# Patient Record
Sex: MALE | Race: WHITE | NOT HISPANIC OR LATINO | Employment: FULL TIME | URBAN - METROPOLITAN AREA
[De-identification: names, ages, dates, MRNs, and addresses within clinical notes are randomized per-mention and may not be internally consistent; named-entity substitution may affect disease eponyms.]

---
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
## 2018-01-16 DIAGNOSIS — T75.89XA EFFECTS OF EXPOSURE TO EXTERNAL CAUSE, INITIAL ENCOUNTER: ICD-10-CM

## 2018-01-16 DIAGNOSIS — E78.00 PURE HYPERCHOLESTEROLEMIA: ICD-10-CM

## 2018-01-19 ENCOUNTER — HOSPITAL ENCOUNTER (OUTPATIENT)
Dept: LAB | Facility: MEDICAL CENTER | Age: 18
End: 2018-01-19
Attending: FAMILY MEDICINE
Payer: COMMERCIAL

## 2018-01-19 DIAGNOSIS — T75.89XA EFFECTS OF EXPOSURE TO EXTERNAL CAUSE, INITIAL ENCOUNTER: ICD-10-CM

## 2018-01-19 DIAGNOSIS — E78.00 PURE HYPERCHOLESTEROLEMIA: ICD-10-CM

## 2018-01-19 LAB
CHOLEST SERPL-MCNC: 243 MG/DL (ref 100–199)
HAV IGM SERPL QL IA: NEGATIVE
HBV CORE IGM SER QL: NEGATIVE
HBV SURFACE AG SER QL: NEGATIVE
HCV AB SER QL: NEGATIVE
HDLC SERPL-MCNC: 39 MG/DL
HIV 1+2 AB+HIV1 P24 AG SERPL QL IA: NON REACTIVE
LDLC SERPL CALC-MCNC: 168 MG/DL
TREPONEMA PALLIDUM IGG+IGM AB [PRESENCE] IN SERUM OR PLASMA BY IMMUNOASSAY: NON REACTIVE
TRIGL SERPL-MCNC: 181 MG/DL (ref 0–149)

## 2018-01-19 PROCEDURE — 80061 LIPID PANEL: CPT

## 2018-01-19 PROCEDURE — 87591 N.GONORRHOEAE DNA AMP PROB: CPT

## 2018-01-19 PROCEDURE — 87389 HIV-1 AG W/HIV-1&-2 AB AG IA: CPT

## 2018-01-19 PROCEDURE — 86780 TREPONEMA PALLIDUM: CPT

## 2018-01-19 PROCEDURE — 36415 COLL VENOUS BLD VENIPUNCTURE: CPT

## 2018-01-19 PROCEDURE — 80074 ACUTE HEPATITIS PANEL: CPT

## 2018-01-19 PROCEDURE — 87491 CHLMYD TRACH DNA AMP PROBE: CPT

## 2018-01-20 LAB
C TRACH DNA SPEC QL NAA+PROBE: NEGATIVE
N GONORRHOEA DNA SPEC QL NAA+PROBE: NEGATIVE
SPECIMEN SOURCE: NORMAL

## 2018-04-20 ENCOUNTER — OFFICE VISIT (OUTPATIENT)
Dept: MEDICAL GROUP | Age: 18
End: 2018-04-20
Payer: COMMERCIAL

## 2018-04-20 VITALS
WEIGHT: 217 LBS | DIASTOLIC BLOOD PRESSURE: 80 MMHG | SYSTOLIC BLOOD PRESSURE: 123 MMHG | BODY MASS INDEX: 30.38 KG/M2 | HEART RATE: 94 BPM | TEMPERATURE: 98.4 F | HEIGHT: 71 IN | OXYGEN SATURATION: 99 %

## 2018-04-20 DIAGNOSIS — E66.9 OBESITY (BMI 30-39.9): ICD-10-CM

## 2018-04-20 DIAGNOSIS — F90.1 ATTENTION DEFICIT HYPERACTIVITY DISORDER (ADHD), PREDOMINANTLY HYPERACTIVE TYPE: ICD-10-CM

## 2018-04-20 DIAGNOSIS — E78.00 PURE HYPERCHOLESTEROLEMIA: ICD-10-CM

## 2018-04-20 PROBLEM — F90.9 ATTENTION DEFICIT HYPERACTIVITY DISORDER (ADHD): Status: ACTIVE | Noted: 2018-04-20

## 2018-04-20 PROCEDURE — 99214 OFFICE O/P EST MOD 30 MIN: CPT | Performed by: FAMILY MEDICINE

## 2018-04-20 RX ORDER — DEXTROAMPHETAMINE SACCHARATE, AMPHETAMINE ASPARTATE, DEXTROAMPHETAMINE SULFATE AND AMPHETAMINE SULFATE 2.5; 2.5; 2.5; 2.5 MG/1; MG/1; MG/1; MG/1
5 TABLET ORAL EVERY MORNING
Qty: 30 TAB | Refills: 0 | Status: SHIPPED | OUTPATIENT
Start: 2018-04-20 | End: 2018-04-23 | Stop reason: SDUPTHER

## 2018-04-20 ASSESSMENT — PATIENT HEALTH QUESTIONNAIRE - PHQ9: CLINICAL INTERPRETATION OF PHQ2 SCORE: 0

## 2018-04-20 NOTE — ASSESSMENT & PLAN NOTE
Patient states he has been exercising and watching his diet and he is not at his goal of losing 20 pounds to address his hyperlipidemia but he plans to be in the next couple months.

## 2018-04-20 NOTE — ASSESSMENT & PLAN NOTE
The patient has been using diet and exercise to lose weight in order to address this issue. He also states is a strong family history of hyperlipidemia.      Results for ANAY PADGETT (MRN [id number]) as of 4/20/2018 10:47   Ref. Range 1/19/2018 13:43   Cholesterol,Tot Latest Ref Range: 100 - 199 mg/dL 243 (H)   Triglycerides Latest Ref Range: 0 - 149 mg/dL 181 (H)   HDL Latest Ref Range: >=40 mg/dL 39 (A)   LDL Latest Ref Range: <100 mg/dL 168 (H)

## 2018-04-20 NOTE — ASSESSMENT & PLAN NOTE
Was diagnosed with ADD as a child at age 3  Was placed on straterra did not think those affected  Recently having more difficulty focusing in his senior year at [employer]. 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 He would like to try Adderall.

## 2018-04-20 NOTE — PROGRESS NOTES
This medical record contains text that has been entered with the assistance of computer voice recognition and dictation software.  Therefore, it may contain unintended errors in text, spelling, punctuation, or grammar    Chief Complaint   Patient presents with   • Annual Exam   • ADHD       Anay Chowdhury is a 17 y.o. male here evaluation and management of: Concerns of ADD, hyperlipidemia, obesity      HPI:     Attention deficit hyperactivity disorder (ADHD)  Was diagnosed with ADD as a child at age 3  Was placed on straterra did not think those affected  Recently having more difficulty focusing in his senior year at [employer]. He has been accepted into the accelerated Master's program in computer programming, he's been having much difficulty paying attention to detailed oriented tasks on [employer]. He would like to try Adderall.    Pure hypercholesterolemia  The patient has been using diet and exercise to lose weight in order to address this issue. He also states is a strong family history of hyperlipidemia.      Results for ANAY CHOWDHURY (MRN [id number]) as of 4/20/2018 10:47   Ref. Range 1/19/2018 13:43   Cholesterol,Tot Latest Ref Range: 100 - 199 mg/dL 243 (H)   Triglycerides Latest Ref Range: 0 - 149 mg/dL 181 (H)   HDL Latest Ref Range: >=40 mg/dL 39 (A)   LDL Latest Ref Range: <100 mg/dL 168 (H)       Obesity (BMI 30-39.9)  Patient states he has been exercising and watching his diet and he is not at his goal of losing 20 pounds to address his hyperlipidemia but he plans to be in the next couple months.    Current medicines (including changes today)  Current Outpatient Prescriptions   Medication Sig Dispense Refill   • amphetamine-dextroamphetamine (ADDERALL, 10MG,) 10 MG Tab Take 0.5 Tabs by mouth every morning for 60 days. 30 Tab 0   • tretinoin (RETIN-A) 0.025 % cream Thin layer to clean dry skin every bedtime as needed for acne 45 g 3   • imiquimod (ALDARA) 5 % cream Apply to affected area  "every other night then wash off in morning. 1 Each 1   • CLINDAMYCIN PHOSPHATE,TOPICAL, (CLEOCIN-T) 1 % Lotion Thin layer to clean dry skin bid as needed for acne 60 mL 5     No current facility-administered medications for this visit.      He  has a past medical history of Acne; ADD (attention deficit disorder); and Pure hypercholesterolemia (8/26/2016).  He  has a past surgical history that includes tympanoplasty and tympanotomy.  Social History   Substance Use Topics   • Smoking status: Never Smoker   • Smokeless tobacco: Never Used   • Alcohol use 0.0 oz/week      Comment: occasionally     Social History     Social History Narrative    2013: [employer]. Web design elective.    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 Mother is from [place].    2014 [employer]umberto at [employer]: wants to go to [employer]. Active in [employer], rifle, [employer], martial arts and does volunteer work and takes 3 AP classes.     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 Now wants to go to [employer]                     Family History   Problem Relation Age of Onset   • Hyperlipidemia Mother      Family Status   Relation Status   • Mother Alive   • Father Alive         ROS    Please see hpi     All other systems reviewed and are negative     Objective:     Blood pressure 123/80, pulse 94, temperature 36.9 °C (98.4 °F), height 1.791 m (5' 10.5\"), weight 98.4 kg (217 lb), SpO2 99 %. Body mass index is 30.7 kg/m².  Physical Exam:    Constitutional: Alert, no distress.  Skin: Warm, dry, good turgor, no rashes in visible areas.  Eye: Equal, round and reactive, conjunctiva clear, lids normal.  ENMT: Lips without lesions, good dentition, oropharynx clear.  Neck: Trachea midline, no masses, no thyromegaly. No cervical or supraclavicular lymphadenopathy.  Respiratory: Unlabored respiratory effort, lungs clear to auscultation, no wheezes, no ronchi.  Cardiovascular: Normal S1, S2, no murmur, no edema.  Abdomen: Soft, non-tender, no " masses, no hepatosplenomegaly.  Psych: Alert and oriented x3, normal affect and mood.              Assessment and Plan:   The following treatment plan was discussed      1. Attention deficit hyperactivity disorder (ADHD), predominantly hyperactive type    . Meets DSM V criteria for ADHD.  All side effects were explained to patients including but not limited to agitation, HTN,  insomnia, nervousness, wt loss, Emotional lability , anxiety , dizziness, and potential for abuse, etc..Patient is to RTC in 1wk to discuss effects of medication and adust dose if needed.    - amphetamine-dextroamphetamine (ADDERALL, 10MG,) 10 MG Tab; Take 0.5 Tabs by mouth every morning for 60 days.  Dispense: 30 Tab; Refill: 0    2. Obesity (BMI 30-39.9)    We discussed subsequent randomized trials, weight loss (via lifestyle or pharmacologic therapy) has been shown to reduce morbidity (reduction in risk factors for cardiovascular disease     The frequently cited Diabetes Prevention Program (DPP) significantly reduced the rate of progression from impaired glucose tolerance to diabetes over a three-year period in participants randomized to intensive lifestyle modification focusing on weight loss       We also discussed agressive lifestyle modifications with weight loss, aerobic exercise, and eating a prudent diet, which  included avoiding fried foods, using low-fat milk and avoiding simple carbohydrate, making a food diary. If you can't run because of pain do the stationary bike/elipticle or swim 30minutes 3 times per wk, in the beginning and then gradually increase.      3. Pure hypercholesterolemia  Repeat labs in 2 months    - LIPID PROFILE; Future        HEALTH MAINTENANCE:    Instructed to Follow up in clinic or ER for worsening symptoms, difficulty breathing, lack of expected recovery, or should new symptoms or problems arise.    Followup: Return in about 4 weeks (around 5/18/2018) for Discussing tollerance and efficacy of  medication.       Once again this medical record contains text that has been entered with the assistance of computer voice recognition and dictation software.  Therefore, it may contain unintended errors in text, spelling, punctuation, or grammar

## 2018-04-23 ENCOUNTER — TELEPHONE (OUTPATIENT)
Dept: MEDICAL GROUP | Age: 18
End: 2018-04-23

## 2018-04-23 DIAGNOSIS — F90.1 ATTENTION DEFICIT HYPERACTIVITY DISORDER (ADHD), PREDOMINANTLY HYPERACTIVE TYPE: ICD-10-CM

## 2018-04-23 NOTE — TELEPHONE ENCOUNTER
DOCUMENTATION OF PAR STATUS:    1. Name of Medication & Dose: amphetamine-dextroamphetamine (ADDERALL, 10MG,) 10 MG Tab    2. Name of Prescription Coverage Company & phone #: [facility] [phone]    3. Date Prior Auth Submitted: 4/23/18    4. What information was given to obtain insurance decision?     5. Prior Auth Status? Pending    6. Patient Notified: yes

## 2018-04-24 ENCOUNTER — TELEPHONE (OUTPATIENT)
Dept: MEDICAL GROUP | Age: 18
End: 2018-04-24

## 2018-04-24 DIAGNOSIS — F90.1 ATTENTION DEFICIT HYPERACTIVITY DISORDER (ADHD), PREDOMINANTLY HYPERACTIVE TYPE: ICD-10-CM

## 2018-04-24 RX ORDER — DEXTROAMPHETAMINE SACCHARATE, AMPHETAMINE ASPARTATE, DEXTROAMPHETAMINE SULFATE AND AMPHETAMINE SULFATE 2.5; 2.5; 2.5; 2.5 MG/1; MG/1; MG/1; MG/1
5 TABLET ORAL EVERY MORNING
Qty: 30 TAB | Refills: 0 | Status: SHIPPED | OUTPATIENT
Start: 2018-04-24 | End: 2018-05-23 | Stop reason: SDUPTHER

## 2018-04-24 RX ORDER — DEXTROAMPHETAMINE SACCHARATE, AMPHETAMINE ASPARTATE, DEXTROAMPHETAMINE SULFATE AND AMPHETAMINE SULFATE 2.5; 2.5; 2.5; 2.5 MG/1; MG/1; MG/1; MG/1
5 TABLET ORAL EVERY MORNING
Qty: 30 TAB | Refills: 0 | Status: SHIPPED
Start: 2018-04-24 | End: 2018-04-24 | Stop reason: SDUPTHER

## 2018-04-24 NOTE — TELEPHONE ENCOUNTER
DOCUMENTATION OF PAR STATUS:    1. Name of Medication & Dose: Amphetamine-Dextroamphetamine 10MG     2. Name of Prescription Coverage Company & phone #: [facility]  [phone]      3. Date Prior Auth Submitted: 04/24/2018    4. What information was given to obtain insurance decision? Diagnosis code    5. Prior Auth Status? Pending    6. Patient Notified: no

## 2018-04-24 NOTE — TELEPHONE ENCOUNTER
Was the patient seen in the last year in this department? Yes     Does patient have an active prescription for medications requested? Yes     Received Request Via: Pharmacy     Pharmacy faxed back stating they need to have it on a paper script with [place] regulations. Cannot be by fax.

## 2018-05-23 ENCOUNTER — OFFICE VISIT (OUTPATIENT)
Dept: MEDICAL GROUP | Age: 18
End: 2018-05-23
Payer: COMMERCIAL

## 2018-05-23 VITALS
TEMPERATURE: 98.4 F | SYSTOLIC BLOOD PRESSURE: 128 MMHG | BODY MASS INDEX: 30.94 KG/M2 | DIASTOLIC BLOOD PRESSURE: 88 MMHG | HEIGHT: 71 IN | WEIGHT: 221 LBS | OXYGEN SATURATION: 97 % | HEART RATE: 85 BPM

## 2018-05-23 DIAGNOSIS — F90.1 ATTENTION DEFICIT HYPERACTIVITY DISORDER (ADHD), PREDOMINANTLY HYPERACTIVE TYPE: ICD-10-CM

## 2018-05-23 DIAGNOSIS — G89.29 CHRONIC LOW BACK PAIN WITH SCIATICA, SCIATICA LATERALITY UNSPECIFIED, UNSPECIFIED BACK PAIN LATERALITY: ICD-10-CM

## 2018-05-23 DIAGNOSIS — M54.40 CHRONIC LOW BACK PAIN WITH SCIATICA, SCIATICA LATERALITY UNSPECIFIED, UNSPECIFIED BACK PAIN LATERALITY: ICD-10-CM

## 2018-05-23 DIAGNOSIS — E66.9 OBESITY (BMI 30-39.9): ICD-10-CM

## 2018-05-23 PROCEDURE — 99214 OFFICE O/P EST MOD 30 MIN: CPT | Performed by: FAMILY MEDICINE

## 2018-05-23 RX ORDER — DEXTROAMPHETAMINE SACCHARATE, AMPHETAMINE ASPARTATE, DEXTROAMPHETAMINE SULFATE AND AMPHETAMINE SULFATE 2.5; 2.5; 2.5; 2.5 MG/1; MG/1; MG/1; MG/1
TABLET ORAL
Qty: 30 TAB | Refills: 0 | Status: SHIPPED | OUTPATIENT
Start: 2018-05-23 | End: 2018-05-23 | Stop reason: SDUPTHER

## 2018-05-23 RX ORDER — DEXTROAMPHETAMINE SACCHARATE, AMPHETAMINE ASPARTATE, DEXTROAMPHETAMINE SULFATE AND AMPHETAMINE SULFATE 2.5; 2.5; 2.5; 2.5 MG/1; MG/1; MG/1; MG/1
TABLET ORAL
Qty: 30 TAB | Refills: 0 | Status: SHIPPED | OUTPATIENT
Start: 2018-05-23 | End: 2018-06-20 | Stop reason: SDUPTHER

## 2018-05-23 NOTE — PROGRESS NOTES
This medical record contains text that has been entered with the assistance of computer voice recognition and dictation software.  Therefore, it may contain unintended errors in text, spelling, punctuation, or grammar    Chief Complaint   Patient presents with   • Follow-Up     Adderoll check         Sushil Chowdhury is a 18 y.o. male here evaluation and management of: add, back pain, obesity      HPI:     Attention deficit hyperactivity disorder (ADHD)  The patient has been taking Adderall 5 mg twice daily and has noticed a significant improvement in his ability to focus.  He is currently in the accelerated masters program for computer programming.  He is tolerating the medication just fine.  He was also diagnosed with ADD as a child at age for life.  Denies any insomnia no anxiety attacks no weight loss in fact he has gained some weight.    Chronic low back pain  Overall the back pain has improved, he states that it was from bad posture from sitting at his desk.  He denies any loss of bladder or bowel function no numbness in the perineum.    Obesity (BMI 30-39.9)  The patient states that he has not been good with his diet recently because of finals, he and his girlfriend plan to join the gym next week and have a more strict healthy diet. denies any new fatigue, cold/heat intolerance, weight gain/weight loss, diarrhea/constipation, dry skin, myalgia, depressed mood, palpitations, tremmor, hair loss, and no goiter.      Current medicines (including changes today)  Current Outpatient Prescriptions   Medication Sig Dispense Refill   • amphetamine-dextroamphetamine (ADDERALL, 10MG,) 10 MG Tab Take 1/2 tab po bid 30 Tab 0   • imiquimod (ALDARA) 5 % cream Apply to affected area every other night then wash off in morning. 1 Each 1   • tretinoin (RETIN-A) 0.025 % cream Thin layer to clean dry skin every bedtime as needed for acne 45 g 3   • CLINDAMYCIN PHOSPHATE,TOPICAL, (CLEOCIN-T) 1 % Lotion Thin layer to clean dry skin  "bid as needed for acne 60 mL 5     No current facility-administered medications for this visit.      He  has a past medical history of Acne; ADD (attention deficit disorder); and Pure hypercholesterolemia (8/26/2016).  He  has a past surgical history that includes tympanoplasty and tympanotomy.  Social History   Substance Use Topics   • Smoking status: Never Smoker   • Smokeless tobacco: Never Used   • Alcohol use 0.0 oz/week      Comment: occasionally     Social History     Social History Narrative    2013: [employer]. Web design elective.    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 Mother is from [place].    2014 [employer]umberto at [employer]: wants to go to [employer]. Active in [employer], rifle, [employer], martial [employer] and does volunteer work and takes 3 AP classes.     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 Now wants to go to [employer]                     Family History   Problem Relation Age of Onset   • Hyperlipidemia Mother      Family Status   Relation Status   • Mother Alive   • Father Alive         ROS    Please see hpi     All other systems reviewed and are negative     Objective:     Blood pressure 128/88, pulse 85, temperature 36.9 °C (98.4 °F), height 1.791 m (5' 10.5\"), weight 100.2 kg (221 lb), SpO2 97 %. Body mass index is 31.26 kg/m².  Physical Exam:    Constitutional: Alert, no distress.  Skin: Warm, dry, good turgor, no rashes in visible areas.  Eye: Equal, round and reactive, conjunctiva clear, lids normal.  ENMT: Lips without lesions, good dentition, oropharynx clear.  Neck: Trachea midline, no masses, no thyromegaly. No cervical or supraclavicular lymphadenopathy.  Respiratory: Unlabored respiratory effort, lungs clear to auscultation, no wheezes, no ronchi.  Cardiovascular: Normal S1, S2, no murmur, no edema.  Abdomen: Soft, non-tender, no masses, no hepatosplenomegaly.  Psych: Alert and oriented x3, normal affect and mood.          Assessment and Plan:   The following treatment " plan was discussed      1. Attention deficit hyperactivity disorder (ADHD), predominantly hyperactive type      . Meets DSM V criteria for ADHD, also seen by Psych.  All side effects were explained to patients including but not limited to agitation, HTN,  insomnia, nervousness, wt loss, Emotional lability , anxiety , dizziness, and potential for abuse, etc..Patient is to RTC in 1wk to discuss effects of medication and adust dose if needed.  - amphetamine-dextroamphetamine (ADDERALL, 10MG,) 10 MG Tab; Take 1/2 tab po bid  Dispense: 30 Tab; Refill: 0    2. Obesity (BMI 30-39.9)    We discussed subsequent randomized trials, weight loss (via lifestyle or pharmacologic therapy) has been shown to reduce morbidity (reduction in risk factors for cardiovascular disease     The frequently cited Diabetes Prevention Program (DPP) significantly reduced the rate of progression from impaired glucose tolerance to diabetes over a three-year period in participants randomized to intensive lifestyle modification focusing on weight loss       We also discussed agressive lifestyle modifications with weight loss, aerobic exercise, and eating a prudent diet, which  included avoiding fried foods, using low-fat milk and avoiding simple carbohydrate, making a food diary. If you can't run because of pain do the stationary bike/elipticle or swim 30minutes 3 times per wk, in the beginning and then gradually increase.      3. Chronic low back pain with sciatica, sciatica laterality unspecified, unspecified back pain laterality    Patient informed that overall prognosis of back pain is favorable, he is to use ICE x 2 days, then switch to heat,  Nsaids,  and to ease back into normal activity as quickly as possible,  also to use proper lifting techniques (use legs not back), no clinical indication for imaging. Also counseled patient on low back stretching, hamstring stretching, core strengthening once no longer in acute pain.           HEALTH  MAINTENANCE:    Instructed to Follow up in clinic or ER for worsening symptoms, difficulty breathing, lack of expected recovery, or should new symptoms or problems arise.    Followup: Return in about 3 months (around 8/23/2018) for Medication refill.       Once again this medical record contains text that has been entered with the assistance of computer voice recognition and dictation software.  Therefore, it may contain unintended errors in text, spelling, punctuation, or grammar

## 2018-05-23 NOTE — ASSESSMENT & PLAN NOTE
The patient states that he has not been good with his diet recently because of finals, he and his girlfriend plan to join the gym next week and have a more strict healthy diet. denies any new fatigue, cold/heat intolerance, weight gain/weight loss, diarrhea/constipation, dry skin, myalgia, depressed mood, palpitations, tremmor, hair loss, and no goiter.

## 2018-05-23 NOTE — ASSESSMENT & PLAN NOTE
The patient has been taking Adderall 5 mg twice daily and has noticed a significant improvement in his ability to focus.  He is currently in the accelerated masters program for computer programming.  He is tolerating the medication just fine.  He was also diagnosed with ADD as a child at age for life.  Denies any insomnia no anxiety attacks no weight loss in fact he has gained some weight.

## 2018-05-23 NOTE — ASSESSMENT & PLAN NOTE
Overall the back pain has improved, he states that it was from bad posture from sitting at his desk.  He denies any loss of bladder or bowel function no numbness in the perineum.

## 2018-06-20 DIAGNOSIS — E66.9 OBESITY (BMI 30-39.9): ICD-10-CM

## 2018-06-20 DIAGNOSIS — F90.1 ATTENTION DEFICIT HYPERACTIVITY DISORDER (ADHD), PREDOMINANTLY HYPERACTIVE TYPE: ICD-10-CM

## 2018-06-20 RX ORDER — DEXTROAMPHETAMINE SACCHARATE, AMPHETAMINE ASPARTATE, DEXTROAMPHETAMINE SULFATE AND AMPHETAMINE SULFATE 2.5; 2.5; 2.5; 2.5 MG/1; MG/1; MG/1; MG/1
TABLET ORAL
Qty: 15 TAB | Refills: 0 | Status: SHIPPED | OUTPATIENT
Start: 2018-06-20 | End: 2018-06-20 | Stop reason: SDUPTHER

## 2018-06-20 RX ORDER — DEXTROAMPHETAMINE SACCHARATE, AMPHETAMINE ASPARTATE, DEXTROAMPHETAMINE SULFATE AND AMPHETAMINE SULFATE 2.5; 2.5; 2.5; 2.5 MG/1; MG/1; MG/1; MG/1
TABLET ORAL
Qty: 15 TAB | Refills: 0 | Status: SHIPPED | OUTPATIENT
Start: 2018-06-20 | End: 2018-09-11 | Stop reason: SDUPTHER

## 2018-09-11 ENCOUNTER — OFFICE VISIT (OUTPATIENT)
Dept: MEDICAL GROUP | Age: 18
End: 2018-09-11
Payer: COMMERCIAL

## 2018-09-11 VITALS
DIASTOLIC BLOOD PRESSURE: 82 MMHG | OXYGEN SATURATION: 93 % | HEIGHT: 71 IN | TEMPERATURE: 98.4 F | WEIGHT: 211 LBS | HEART RATE: 83 BPM | SYSTOLIC BLOOD PRESSURE: 126 MMHG | BODY MASS INDEX: 29.54 KG/M2

## 2018-09-11 DIAGNOSIS — L70.0 ACNE VULGARIS: ICD-10-CM

## 2018-09-11 DIAGNOSIS — F90.1 ATTENTION DEFICIT HYPERACTIVITY DISORDER (ADHD), PREDOMINANTLY HYPERACTIVE TYPE: ICD-10-CM

## 2018-09-11 DIAGNOSIS — Z23 NEED FOR VACCINATION: ICD-10-CM

## 2018-09-11 DIAGNOSIS — E66.9 OBESITY (BMI 30-39.9): ICD-10-CM

## 2018-09-11 PROCEDURE — 99214 OFFICE O/P EST MOD 30 MIN: CPT | Performed by: FAMILY MEDICINE

## 2018-09-11 RX ORDER — DEXTROAMPHETAMINE SACCHARATE, AMPHETAMINE ASPARTATE, DEXTROAMPHETAMINE SULFATE AND AMPHETAMINE SULFATE 2.5; 2.5; 2.5; 2.5 MG/1; MG/1; MG/1; MG/1
TABLET ORAL
Qty: 15 TAB | Refills: 0 | Status: SHIPPED | OUTPATIENT
Start: 2018-09-11 | End: 2018-10-12

## 2018-09-11 RX ORDER — ADAPALENE AND BENZOYL PEROXIDE 3; 25 MG/G; MG/G
1 GEL TOPICAL EVERY 24 HOURS
Qty: 30 G | Refills: 2 | Status: SHIPPED | OUTPATIENT
Start: 2018-09-11

## 2018-09-11 RX ORDER — DEXTROAMPHETAMINE SACCHARATE, AMPHETAMINE ASPARTATE, DEXTROAMPHETAMINE SULFATE AND AMPHETAMINE SULFATE 2.5; 2.5; 2.5; 2.5 MG/1; MG/1; MG/1; MG/1
TABLET ORAL
Qty: 15 TAB | Refills: 0 | Status: SHIPPED | OUTPATIENT
Start: 2018-09-11 | End: 2018-09-11 | Stop reason: SDUPTHER

## 2018-09-11 NOTE — PROGRESS NOTES
This medical record contains text that has been entered with the assistance of computer voice recognition and dictation software.  Therefore, it may contain unintended errors in text, spelling, punctuation, or grammar    Chief Complaint   Patient presents with   • ADHD     medication refill         Sushil Chowdhury is a 18 y.o. male here evaluation and management of: Medication refill acne ADHD obesity      HPI:     Attention deficit hyperactivity disorder (ADHD)  Patient has been taking Adderall 5 mg twice daily.  He has been on this for several years and stable.  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  The Adderall has allowed him to focus to detail and not forget any tasks needed to be successful in his current academic program.  He denies any nervousness, no decreased appetite no insomnia no chest pain.  The patient denied any chest pain, no sob, no thomas, no  pnd, no orthopnea, no headache, no changes in vision, no numbness or tingling, no nausea, no diarrhea, no abdominal pain, no fevers, no chills, no bright red blood per rectum, no  difficulty urinating, no burning during micturition, no depressed mood, no other concerns.    Acne  Patient states that the topical Retin-A is not working.  He has had good results with epidural in the past.  He would like to try that again.    Obesity (BMI 30-39.9)  Patient states he has been more consistent by going to the gym 3-4 times per week as a result he has lost 10 pounds in the past 4 months. denies any new fatigue, cold/heat intolerance, weight gain/weight loss, diarrhea/constipation, dry skin, myalgia, depressed mood, palpitations, tremmor, hair loss, and no goiter.      Current medicines (including changes today)  Current Outpatient Prescriptions   Medication Sig Dispense Refill   • amphetamine-dextroamphetamine (ADDERALL, 10MG,) 10 MG Tab Take 1/2 tab po bid 15 Tab 0   • tretinoin (RETIN-A) 0.025 % cream Thin  "layer to clean dry skin every bedtime as needed for acne 45 g 3   • Adapalene-Benzoyl Peroxide (EPIDUO FORTE) 0.3-2.5 % Gel 1 Application by Apply externally route every 24 hours. 30 g 2     No current facility-administered medications for this visit.      He  has a past medical history of Acne; ADD (attention deficit disorder); and Pure hypercholesterolemia (8/26/2016).  He  has a past surgical history that includes tympanoplasty and tympanotomy.  Social History   Substance Use Topics   • Smoking status: Never Smoker   • Smokeless tobacco: Never Used   • Alcohol use 0.0 oz/week      Comment: occasionally     Social History     Social History Narrative    2013: [employer]. Web design elective.    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 Mother is from [place].    2014 [employer]umberto at [employer]: wants to go to [employer]. Active in [employer], rifle, [employer], martial arts and does volunteer work and takes 3 AP classes.     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 Now wants to go to [employer]                     Family History   Problem Relation Age of Onset   • Hyperlipidemia Mother      Family Status   Relation Status   • Mo Alive   • Fa Alive         ROS    Please see hpi     All other systems reviewed and are negative     Objective:     Blood pressure 126/82, pulse 83, temperature 36.9 °C (98.4 °F), height 1.791 m (5' 10.5\"), weight 95.7 kg (211 lb), SpO2 93 %. Body mass index is 29.85 kg/m².  Physical Exam:    Constitutional: Alert, no distress.  Skin: Warm, dry, good turgor, no rashes in visible areas.  Eye: Equal, round and reactive, conjunctiva clear, lids normal.  ENMT: Lips without lesions, good dentition, oropharynx clear.  Neck: Trachea midline, no masses, no thyromegaly. No cervical or supraclavicular lymphadenopathy.  Respiratory: Unlabored respiratory effort, lungs clear to auscultation, no wheezes, no ronchi.  Cardiovascular: Normal S1, S2, no murmur, no edema.  Abdomen: Soft, " non-tender, no masses, no hepatosplenomegaly.  Psych: Alert and oriented x3, normal affect and mood.          Assessment and Plan:   The following treatment plan was discussed      1. Need for vaccination    Vaccine was administered today without adverse event.    - INFLUENZA VACCINE QUAD INJ >3Y(PF)    2. Attention deficit hyperactivity disorder (ADHD), predominantly hyperactive type    Patient has been stable with current management  We will make no changes for now    - amphetamine-dextroamphetamine (ADDERALL, 10MG,) 10 MG Tab; Take 1/2 tab po bid  Dispense: 15 Tab; Refill: 0    3. Obesity (BMI 30-39.9)    We discussed subsequent randomized trials, weight loss (via lifestyle or pharmacologic therapy) has been shown to reduce morbidity (reduction in risk factors for cardiovascular disease     The frequently cited Diabetes Prevention Program (DPP) significantly reduced the rate of progression from impaired glucose tolerance to diabetes over a three-year period in participants randomized to intensive lifestyle modification focusing on weight loss       We also discussed agressive lifestyle modifications with weight loss, aerobic exercise, and eating a prudent diet, which  included avoiding fried foods, using low-fat milk and avoiding simple carbohydrate, making a food diary. If you can't run because of pain do the stationary bike/elipticle or swim 30minutes 3 times per wk, in the beginning and then gradually increase.      4. Acne vulgaris  Will add Epiduo    - tretinoin (RETIN-A) 0.025 % cream; Thin layer to clean dry skin every bedtime as needed for acne  Dispense: 45 g; Refill: 3  - Adapalene-Benzoyl Peroxide (EPIDUO FORTE) 0.3-2.5 % Gel; 1 Application by Apply externally route every 24 hours.  Dispense: 30 g; Refill: 2        HEALTH MAINTENANCE:    Instructed to Follow up in clinic or ER for worsening symptoms, difficulty breathing, lack of expected recovery, or should new symptoms or problems arise.    Followup:  Return in about 3 months (around 12/11/2018) for Medication refill.       Once again this medical record contains text that has been entered with the assistance of computer voice recognition and dictation software.  Therefore, it may contain unintended errors in text, spelling, punctuation, or grammar

## 2018-09-11 NOTE — ASSESSMENT & PLAN NOTE
Patient has been taking Adderall 5 mg twice daily.  He has been on this for several years and stable.  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  The Adderall has allowed him to focus to detail and not forget any tasks needed to be successful in his current academic program.  He denies any nervousness, no decreased appetite no insomnia no chest pain.  The patient denied any chest pain, no sob, no thomas, no  pnd, no orthopnea, no headache, no changes in vision, no numbness or tingling, no nausea, no diarrhea, no abdominal pain, no fevers, no chills, no bright red blood per rectum, no  difficulty urinating, no burning during micturition, no depressed mood, no other concerns.

## 2018-09-11 NOTE — ASSESSMENT & PLAN NOTE
Patient states he has been more consistent by going to the gym 3-4 times per week as a result he has lost 10 pounds in the past 4 months. denies any new fatigue, cold/heat intolerance, weight gain/weight loss, diarrhea/constipation, dry skin, myalgia, depressed mood, palpitations, tremmor, hair loss, and no goiter.

## 2018-09-11 NOTE — ASSESSMENT & PLAN NOTE
Patient states that the topical Retin-A is not working.  He has had good results with epidural in the past.  He would like to try that again.

## 2019-01-29 ENCOUNTER — OFFICE VISIT (OUTPATIENT)
Dept: MEDICAL GROUP | Age: 19
End: 2019-01-29
Payer: COMMERCIAL

## 2019-01-29 VITALS
TEMPERATURE: 97.8 F | HEIGHT: 71 IN | WEIGHT: 229 LBS | BODY MASS INDEX: 32.06 KG/M2 | OXYGEN SATURATION: 95 % | DIASTOLIC BLOOD PRESSURE: 74 MMHG | HEART RATE: 80 BPM | SYSTOLIC BLOOD PRESSURE: 106 MMHG

## 2019-01-29 DIAGNOSIS — M54.6 ACUTE BILATERAL THORACIC BACK PAIN: ICD-10-CM

## 2019-01-29 PROCEDURE — 99214 OFFICE O/P EST MOD 30 MIN: CPT | Performed by: FAMILY MEDICINE

## 2019-01-29 RX ORDER — MELOXICAM 15 MG/1
15 TABLET ORAL DAILY
Qty: 30 TAB | Refills: 0 | Status: SHIPPED | OUTPATIENT
Start: 2019-01-29

## 2019-01-29 ASSESSMENT — PATIENT HEALTH QUESTIONNAIRE - PHQ9: CLINICAL INTERPRETATION OF PHQ2 SCORE: 0

## 2019-01-29 NOTE — PROGRESS NOTES
This medical record contains text that has been entered with the assistance of computer voice recognition and dictation software.  Therefore, it may contain unintended errors in text, spelling, punctuation, or grammar    Chief Complaint   Patient presents with   • Back Pain     x1 month         Sushil Chowdhury is a 18 y.o. male here evaluation and management of: back pain      HPI:     Acute thoracic back pain  NEW PROBLEM    Patient states about a month ago he began to notice thoracic back pain.  He is unsure if it started after a an awkward night of sleeping there was no history of trauma.  Pain is localized to the thoracic mid back, does not radiate anywhere no loss of bladder or bowel function.  The pain is about 3 out of 10, worse mainly when he standing up, improves with rest.  Denies any numbness or tingling in the perineum.  He has not taken anything for the pain, the pain does not radiate anywhere is localized in the thoracic back    Current medicines (including changes today)  Current Outpatient Prescriptions   Medication Sig Dispense Refill   • sertraline (ZOLOFT) 50 MG Tab      • meloxicam (MOBIC) 15 MG tablet Take 1 Tab by mouth every day. 30 Tab 0   • tretinoin (RETIN-A) 0.025 % cream Thin layer to clean dry skin every bedtime as needed for acne 45 g 3   • Adapalene-Benzoyl Peroxide (EPIDUO FORTE) 0.3-2.5 % Gel 1 Application by Apply externally route every 24 hours. 30 g 2     No current facility-administered medications for this visit.      He  has a past medical history of Acne; ADD (attention deficit disorder); and Pure hypercholesterolemia (8/26/2016).  He  has a past surgical history that includes tympanoplasty and tympanotomy.  Social History   Substance Use Topics   • Smoking status: Never Smoker   • Smokeless tobacco: Never Used   • Alcohol use 0.0 oz/week      Comment: occasionally     Social History     Social History Narrative    2013: [employer]. Web design elective.    [employer] and [employer] (secondary )    In [place] all life. Mother is from [place].    2014 [employer]: wants to go to [employer]. 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     2015: [employer]., Honors student. Now wants to go to [employer]                     Family History   Problem Relation Age of Onset   • Hyperlipidemia Mother      Family Status   Relation Status   • Mo Alive   • Fa Alive         ROS    Please see hpi     All other systems reviewed and are negative     Objective:     Blood pressure 106/74, pulse 80, temperature 36.6 °C (97.8 °F), temperature source Temporal, height 1.791 m (5' 10.5\"), weight 103.9 kg (229 lb), SpO2 95 %. Body mass index is 32.39 kg/m².  Physical Exam:    Constitutional: Alert, no distress.  Skin: Warm, dry, good turgor, no rashes in visible areas.  Eye: Equal, round and reactive, conjunctiva clear, lids normal.  ENMT: Lips without lesions, good dentition, oropharynx clear.  Neck: Trachea midline, no masses, no thyromegaly. No cervical or supraclavicular lymphadenopathy.  Respiratory: Unlabored respiratory effort, lungs clear to auscultation, no wheezes, no ronchi.  Cardiovascular: Normal S1, S2, no murmur, no edema.  Abdomen: Soft, non-tender, no masses, no hepatosplenomegaly.  Psych: Alert and oriented x3, normal affect and mood.    BACK Pain exam Thoracic and Lumbar Spine  Inspection of back and posture -revealed a normal exam  Range of motion = 180 degrees bilaterally  Palpation of the spine =NTT  no spasms noted, no lumbar spine tenderness, no saddle anasthesia, able to dorsiflex bilateral toes, 2+DTRs (patellar) bilaterally,  negative straight leg raise bilaterally both supine and seated,  Nontender bilateral erector spinae, normal gait         Assessment and Plan:   The following treatment plan was discussed      1. Acute bilateral thoracic back pain    Patient informed that overall prognosis of back pain is " favorable, he is to use ICE x 2 days, then switch to heat,  Nsaids,  and to ease back into normal activity as quickly as possible,  also to use proper lifting techniques (use legs not back), no clinical indication for imaging. Also counseled patient on low back stretching, hamstring stretching, core strengthening once no longer in acute pain.   - REFERRAL TO PHYSICAL THERAPY Reason for Therapy: Eval/Treat/Report        Instructed to Follow up in clinic or ER for worsening symptoms, difficulty breathing, lack of expected recovery, or should new symptoms or problems arise.    Followup: Return in about 2 months (around 3/29/2019) for Reevaluation.       Once again this medical record contains text that has been entered with the assistance of computer voice recognition and dictation software.  Therefore, it may contain unintended errors in text, spelling, punctuation, or grammar

## 2019-01-29 NOTE — ASSESSMENT & PLAN NOTE
NEW PROBLEM    Patient states about a month ago he began to notice thoracic back pain.  He is unsure if it started after a an awkward night of sleeping there was no history of trauma.  Pain is localized to the thoracic mid back, does not radiate anywhere no loss of bladder or bowel function.  The pain is about 3 out of 10, worse mainly when he standing up, improves with rest.  Denies any numbness or tingling in the perineum.  He has not taken anything for the pain, the pain does not radiate anywhere is localized in the thoracic back

## 2019-03-05 ENCOUNTER — APPOINTMENT (OUTPATIENT)
Dept: PHYSICAL THERAPY | Facility: MEDICAL CENTER | Age: 19
End: 2019-03-05
Payer: COMMERCIAL

## 2019-03-07 ENCOUNTER — APPOINTMENT (OUTPATIENT)
Dept: PHYSICAL THERAPY | Facility: MEDICAL CENTER | Age: 19
End: 2019-03-07
Attending: FAMILY MEDICINE
Payer: COMMERCIAL

## 2019-03-12 ENCOUNTER — APPOINTMENT (OUTPATIENT)
Dept: PHYSICAL THERAPY | Facility: MEDICAL CENTER | Age: 19
End: 2019-03-12
Payer: COMMERCIAL

## 2019-03-14 ENCOUNTER — APPOINTMENT (OUTPATIENT)
Dept: PHYSICAL THERAPY | Facility: MEDICAL CENTER | Age: 19
End: 2019-03-14
Payer: COMMERCIAL

## 2019-03-19 ENCOUNTER — APPOINTMENT (OUTPATIENT)
Dept: PHYSICAL THERAPY | Facility: MEDICAL CENTER | Age: 19
End: 2019-03-19
Payer: COMMERCIAL

## 2019-03-21 ENCOUNTER — APPOINTMENT (OUTPATIENT)
Dept: PHYSICAL THERAPY | Facility: MEDICAL CENTER | Age: 19
End: 2019-03-21
Attending: FAMILY MEDICINE
Payer: COMMERCIAL

## 2019-04-02 ENCOUNTER — HOSPITAL ENCOUNTER (OUTPATIENT)
Dept: LAB | Facility: MEDICAL CENTER | Age: 19
End: 2019-04-02
Attending: FAMILY MEDICINE
Payer: COMMERCIAL

## 2019-04-02 DIAGNOSIS — E78.00 PURE HYPERCHOLESTEROLEMIA: ICD-10-CM

## 2019-04-02 DIAGNOSIS — Z20.2 EXPOSURE TO SEXUALLY TRANSMITTED DISEASE (STD): ICD-10-CM

## 2019-04-02 LAB
CHOLEST SERPL-MCNC: 230 MG/DL (ref 100–199)
FASTING STATUS PATIENT QL REPORTED: NORMAL
HAV IGM SERPL QL IA: NEGATIVE
HBV CORE IGM SER QL: NEGATIVE
HBV SURFACE AG SER QL: NEGATIVE
HCV AB SER QL: NEGATIVE
HDLC SERPL-MCNC: 36 MG/DL
HIV 1+2 AB+HIV1 P24 AG SERPL QL IA: NON REACTIVE
LDLC SERPL CALC-MCNC: 144 MG/DL
TREPONEMA PALLIDUM IGG+IGM AB [PRESENCE] IN SERUM OR PLASMA BY IMMUNOASSAY: NON REACTIVE
TRIGL SERPL-MCNC: 251 MG/DL (ref 0–149)

## 2019-04-02 PROCEDURE — 86696 HERPES SIMPLEX TYPE 2 TEST: CPT

## 2019-04-02 PROCEDURE — 80061 LIPID PANEL: CPT

## 2019-04-02 PROCEDURE — 87491 CHLMYD TRACH DNA AMP PROBE: CPT

## 2019-04-02 PROCEDURE — 87389 HIV-1 AG W/HIV-1&-2 AB AG IA: CPT

## 2019-04-02 PROCEDURE — 36415 COLL VENOUS BLD VENIPUNCTURE: CPT

## 2019-04-02 PROCEDURE — 86780 TREPONEMA PALLIDUM: CPT

## 2019-04-02 PROCEDURE — 87591 N.GONORRHOEAE DNA AMP PROB: CPT

## 2019-04-02 PROCEDURE — 80074 ACUTE HEPATITIS PANEL: CPT

## 2019-04-05 LAB — HSV2 GG IGG SER-ACNC: 0.13 IV
